# Patient Record
Sex: FEMALE | Race: ASIAN | Employment: UNEMPLOYED | URBAN - METROPOLITAN AREA
[De-identification: names, ages, dates, MRNs, and addresses within clinical notes are randomized per-mention and may not be internally consistent; named-entity substitution may affect disease eponyms.]

---

## 2017-01-25 ENCOUNTER — APPOINTMENT (OUTPATIENT)
Dept: GENERAL RADIOLOGY | Age: 37
End: 2017-01-25
Attending: EMERGENCY MEDICINE
Payer: SELF-PAY

## 2017-01-25 ENCOUNTER — HOSPITAL ENCOUNTER (EMERGENCY)
Age: 37
Discharge: HOME | End: 2017-01-25
Attending: EMERGENCY MEDICINE
Payer: SELF-PAY

## 2017-01-25 VITALS
DIASTOLIC BLOOD PRESSURE: 81 MMHG | HEIGHT: 65 IN | HEART RATE: 87 BPM | SYSTOLIC BLOOD PRESSURE: 152 MMHG | BODY MASS INDEX: 45.25 KG/M2 | WEIGHT: 271.61 LBS | OXYGEN SATURATION: 100 % | TEMPERATURE: 97.9 F | RESPIRATION RATE: 20 BRPM

## 2017-01-25 DIAGNOSIS — M54.5 ACUTE LOW BACK PAIN, UNSPECIFIED BACK PAIN LATERALITY, WITH SCIATICA PRESENCE UNSPECIFIED: Primary | ICD-10-CM

## 2017-01-25 DIAGNOSIS — N39.0 URINARY TRACT INFECTION, SITE UNSPECIFIED: ICD-10-CM

## 2017-01-25 LAB
APPEARANCE UR: ABNORMAL
BACTERIA URNS QL MICRO: ABNORMAL /HPF
BILIRUB UR QL: NEGATIVE
COLOR UR: ABNORMAL
EPITH CASTS URNS QL MICRO: ABNORMAL /LPF
GLUCOSE UR STRIP.AUTO-MCNC: NEGATIVE MG/DL
HCG UR QL: NEGATIVE
HGB UR QL STRIP: NEGATIVE
KETONES UR QL STRIP.AUTO: NEGATIVE MG/DL
LEUKOCYTE ESTERASE UR QL STRIP.AUTO: ABNORMAL
NITRITE UR QL STRIP.AUTO: POSITIVE
PH UR STRIP: 5.5 [PH] (ref 5–8)
PROT UR STRIP-MCNC: NEGATIVE MG/DL
RBC #/AREA URNS HPF: ABNORMAL /HPF (ref 0–5)
SP GR UR REFRACTOMETRY: 1.02 (ref 1–1.03)
UA: UC IF INDICATED,UAUC: ABNORMAL
UROBILINOGEN UR QL STRIP.AUTO: 0.2 EU/DL (ref 0.2–1)
WBC URNS QL MICRO: ABNORMAL /HPF (ref 0–4)

## 2017-01-25 PROCEDURE — 81001 URINALYSIS AUTO W/SCOPE: CPT | Performed by: EMERGENCY MEDICINE

## 2017-01-25 PROCEDURE — 99284 EMERGENCY DEPT VISIT MOD MDM: CPT

## 2017-01-25 PROCEDURE — 87086 URINE CULTURE/COLONY COUNT: CPT | Performed by: EMERGENCY MEDICINE

## 2017-01-25 PROCEDURE — 72100 X-RAY EXAM L-S SPINE 2/3 VWS: CPT

## 2017-01-25 PROCEDURE — 81025 URINE PREGNANCY TEST: CPT

## 2017-01-25 PROCEDURE — 74011250637 HC RX REV CODE- 250/637: Performed by: EMERGENCY MEDICINE

## 2017-01-25 PROCEDURE — 87186 SC STD MICRODIL/AGAR DIL: CPT | Performed by: EMERGENCY MEDICINE

## 2017-01-25 PROCEDURE — 87077 CULTURE AEROBIC IDENTIFY: CPT | Performed by: EMERGENCY MEDICINE

## 2017-01-25 RX ORDER — IBUPROFEN 800 MG/1
800 TABLET ORAL
Qty: 20 TAB | Refills: 0 | Status: SHIPPED | OUTPATIENT
Start: 2017-01-25 | End: 2017-02-01

## 2017-01-25 RX ORDER — CYCLOBENZAPRINE HCL 10 MG
10 TABLET ORAL
Status: COMPLETED | OUTPATIENT
Start: 2017-01-25 | End: 2017-01-25

## 2017-01-25 RX ORDER — CEFDINIR 300 MG/1
300 CAPSULE ORAL 2 TIMES DAILY
Qty: 20 CAP | Refills: 0 | Status: SHIPPED | OUTPATIENT
Start: 2017-01-25

## 2017-01-25 RX ORDER — CYCLOBENZAPRINE HCL 10 MG
10 TABLET ORAL
Qty: 15 TAB | Refills: 0 | Status: SHIPPED | OUTPATIENT
Start: 2017-01-25

## 2017-01-25 RX ORDER — CEFDINIR 300 MG/1
300 CAPSULE ORAL
Status: COMPLETED | OUTPATIENT
Start: 2017-01-25 | End: 2017-01-25

## 2017-01-25 RX ORDER — METHYLPREDNISOLONE 4 MG/1
TABLET ORAL
Qty: 1 DOSE PACK | Refills: 0 | Status: SHIPPED | OUTPATIENT
Start: 2017-01-25

## 2017-01-25 RX ADMIN — CEFDINIR 300 MG: 300 CAPSULE ORAL at 18:48

## 2017-01-25 RX ADMIN — CYCLOBENZAPRINE HYDROCHLORIDE 10 MG: 10 TABLET, FILM COATED ORAL at 18:38

## 2017-01-25 NOTE — ED NOTES
Pt given discharge instructions by provider. Opportunity given to ask questions. Pt ambulated to waiting area with family members.

## 2017-01-25 NOTE — ED PROVIDER NOTES
HPI Comments: 42 y.o. female with past medical history significant for bulging disc in back who presents from home with chief complaint of back pain. Pt complains of chronic lower back pain with radiation to the left thigh that was exacerbated after pt moved furniture in her house yesterday. Pt says pain caused her difficulty getting out of bed today, but pt is ambulatory. Pt last took 800 mg ibuprofen ~3hours PTA with some relief. No fever or incontinence. Pt denies PMHx. Pt denies allergies. There are no other acute medical concerns at this time. SHX:  No etoh. Nonsmoker. Note written by Tanvir. Radha Krause, as dictated by Krzysztof Farnsworth MD 5:37 PM      The history is provided by the patient and a relative. The history is limited by a language barrier. A  was used (relative). No past medical history on file. No past surgical history on file. No family history on file. Social History     Social History    Marital status: SINGLE     Spouse name: N/A    Number of children: N/A    Years of education: N/A     Occupational History    Not on file. Social History Main Topics    Smoking status: Not on file    Smokeless tobacco: Not on file    Alcohol use Not on file    Drug use: Not on file    Sexual activity: Not on file     Other Topics Concern    Not on file     Social History Narrative         ALLERGIES: Review of patient's allergies indicates no known allergies. Review of Systems   Musculoskeletal: Positive for back pain (w/ radiation to left thigh). All other systems reviewed and are negative. Vitals:    01/25/17 1707   BP: 152/81   Pulse: 87   Resp: 20   Temp: 97.9 °F (36.6 °C)   SpO2: 100%   Weight: 123.2 kg (271 lb 9.7 oz)   Height: 5' 5\" (1.651 m)            Physical Exam   Constitutional: She is oriented to person, place, and time. She appears well-developed and well-nourished. HENT:   Head: Normocephalic and atraumatic. Mouth/Throat: Oropharynx is clear and moist. No oropharyngeal exudate. Eyes: Conjunctivae are normal. Right eye exhibits no discharge. Left eye exhibits no discharge. No scleral icterus. Neck: Normal range of motion. Neck supple. Cardiovascular: Normal rate, regular rhythm and normal heart sounds. Exam reveals no gallop and no friction rub. No murmur heard. Pulmonary/Chest: Effort normal and breath sounds normal. No respiratory distress. She has no wheezes. She has no rales. Abdominal: Soft. Bowel sounds are normal. She exhibits no distension. There is no tenderness. There is no guarding. Musculoskeletal: Normal range of motion. She exhibits tenderness (mildly tender mid lumbar and left lumbar paraspinal muscles. ). She exhibits no edema. Lymphadenopathy:     She has no cervical adenopathy. Neurological: She is alert and oriented to person, place, and time. No cranial nerve deficit. She exhibits normal muscle tone. Coordination normal.   Ambulates with full weight bearing. Mildly antalgic on left. Intact light touch sensation of left leg   Skin: Skin is warm and dry. No rash noted. No pallor. Nursing note and vitals reviewed. MDM  ED Course   low back pain s/p lifting heavy objects with neuro intact. Check x ray and ua. Took motrin 800 mg. Will add flexeril for muscle spasm. Also will need medrol dose pack.       Procedures

## 2017-01-25 NOTE — ED TRIAGE NOTES
Pt reports hx bulging disc in back x 1.5 yrs ago. Yesterday she was moving furniture in the house and started with pain again. Pain to lower back with radiation to left upper leg.

## 2023-12-04 NOTE — DISCHARGE INSTRUCTIONS
Dolor de espalda: Instrucciones de cuidado - [ Back Pain: Care Instructions ]  Instrucciones de cuidado    El dolor de espalda tiene muchas causas posibles. Con frecuencia, está relacionado con problemas en los músculos y ligamentos de la espalda. También podría estar relacionado con problemas de los nervios, discos o huesos de la espalda. Moverse, levantar algo, ponerse de pie, sentarse o dormir de [employer] incómoda pueden forzar la espalda. Algunas veces no se da cuenta de que tiene tommie lesión [employer] tarde. La artritis es otra causa común del dolor de espalda. Aunque krystle vez duela mucho, el dolor de espalda por lo general mejora por sí mismo en varias semanas. [address] personas se recuperan en 12 semanas o menos. Hacer un buen tratamiento en el hogar y tener cuidado de no forzar la espalda puede ayudar a que se sienta mejor más pronto. La atención de seguimiento es tommie parte clave de morrison tratamiento y seguridad. Asegúrese de hacer y acudir a todas las citas, y llame a morrison médico si está teniendo problemas. También es tommie buena idea saber los resultados de los exámenes y mantener tommie lista de los medicamentos que [place]. ¿Cómo puede cuidarse en el hogar? · Siéntese o acuéstese en las posiciones más cómodas y que reduzcan el dolor. Trate tommie de estas posiciones al acostarse:  ¨ Acuéstese boca arriba con las rodillas dobladas y apoyadas sobre almohadones grandes. ¨ Acuéstese en el piso con las piernas sobre el asiento de un sofá o tommie silla. ¨ Acuéstese de lado con las rodillas y caderas dobladas y [place] entre las piernas. ¨ Acuéstese boca abajo siempre que esta posición no empeore el dolor. · No se siente en la cama y evite los sofás blandos y las posiciones torcidas. El reposo en cama puede aliviar el dolor al principio, carlyle retrasa la sanación. Evite reposar en la cama después del primer día de dolor de espalda. · Cambie de posición cada 30 minutos.  Si debe sentarse por [employer] INDICATION FOR FOLLOW UP:  CAD with prior PCI, HL     HPI:       58 year old female with a past medical history of CAD(prior RCA PCI 4/26/23), HL here for follow up visit.  Doing well overall, only mild dyspnea at times with activity, no chest pain.       PMH:  Patient Active Problem List   Diagnosis    CAD in native artery       Past Surgical History:   Procedure Laterality Date    Coronary angiogram/possible ptca - cv  04/26/2023    Embolization uterine fibroid  2007    Eye surgery  2017    right cataract surgery    Tonsillectomy       MEDICATIONS  Current Outpatient Medications   Medication Sig Dispense Refill    prasugrel (EFFIENT) 10 MG Tab Take 1 tablet by mouth daily. Do not start before April 28, 2023. 90 tablet 3    nitroGLYCERIN (NITRODUR) 0.3 MG/HR patch Place 1 patch onto the skin daily.  Remove patch 12 hours after applying 90 patch 3    atorvastatin (LIPITOR) 80 MG tablet Take 1 tablet by mouth daily. 90 tablet 3    aspirin (Aspirin Childrens) 81 MG chewable tablet Chew 1 tablet by mouth daily. 90 tablet 3    metoPROLOL succinate (Toprol XL) 25 MG 24 hr tablet Take 1 tablet by mouth daily. 90 tablet 3    levothyroxine 100 MCG tablet Take 100 mcg by mouth daily.      VITAMIN D PO Take 1 tablet by mouth daily.      Ascorbic Acid (VITAMIN C PO) Take 1 tablet by mouth daily.      CALCIUM PO Take 1 tablet by mouth daily.      Multiple Vitamin (MULTIVITAMIN ADULT PO) Take 1 tablet by mouth daily.       No current facility-administered medications for this visit.       SOCIAL: No tobacco, etoh, or drug use.   Social History     Socioeconomic History    Marital status: /Civil Union     Spouse name: Not on file    Number of children: Not on file    Years of education: Not on file    Highest education level: Not on file   Occupational History    Not on file   Tobacco Use    Smoking status: Never     Passive exposure: Past    Smokeless tobacco: Never   Vaping Use    Vaping Use: never used   Substance and  Sexual Activity    Alcohol use: Yes     Comment: rarely    Drug use: Never    Sexual activity: Not on file   Other Topics Concern    Not on file   Social History Narrative    Not on file     Social Determinants of Health     Financial Resource Strain: Low Risk  (4/26/2023)    Financial Resource Strain     Social Determinants: Financial Resource Strain: None   Food Insecurity: No Food Insecurity (4/26/2023)    Food Insecurity     Social Determinants: Food Insecurity: Never   Transportation Needs: No Transportation Needs (4/26/2023)    PRAPARE - Transportation     Lack of Transportation (Medical): No     Lack of Transportation (Non-Medical): No   Physical Activity: Not on file   Stress: Not on file   Social Connections: Socially Integrated (4/26/2023)    Social Connections     Social Determinants: Social Connections: 5 or more times a week   Intimate Partner Violence: Not At Risk (4/26/2023)    Intimate Partner Violence     Social Determinants: Intimate Partner Violence Past Fear: No     Social Determinants: Intimate Partner Violence Current Fear: No       FAMILY HISTORY:  +cad and chf in mother (age 60s),  Father with AFIB and pacemaker   Family History   Problem Relation Age of Onset    Kidney disease Mother     Heart disease Mother     Diabetes Mother     COPD Father     Other Father         pacemaker    Heart disease Maternal Grandmother        ALLERGY:    ALLERGIES:  No Known Allergies    PHYSICAL EXAM  Vitals:    12/05/23 1139   BP: 112/66   BP Location: RUE - Right upper extremity   Patient Position: Sitting   Cuff Size: Regular   Pulse: 90   SpO2: 96%   Weight: 72.6 kg (160 lb)   Height: 5' 4\" (1.626 m)       General: Appears stated age, no acute distress.  Eyes: Conjunctivae without exudates or hemorrhage. Pupils symmetric.  Neck: No JVD. No thyroid enlargement.  Cardiac: Normal S1 and S2, regular rate and rhythm. No murmurs, rubs or gallops. Pedal pulses 2/2 bilaterally. Lower extremities without evidence of  tiempo, párese y descanse de estar sentado. Levántese y camine, o acuéstese en tommie posición cómoda. · Pruebe usar tommie almohadilla térmica a temperatura baja o mediana lance 15 a 20 minutos cada 2 ó 3 horas. Pruebe tommie ducha tibia en vez de tommie sesión con la almohadilla térmica. · También puede probar tommie compresa de hielo lance 10 a 15 minutos cada 2 a 3 horas. Póngase un paño melendez entre la compresa de hielo y la piel. · [employer] analgésicos (medicamentos para el dolor) exactamente según las indicaciones. ¨ Si el médico le recetó un analgésico, tómelo según las indicaciones. ¨ Si no está tomando un analgésico recetado, pregúntele a morrison médico si puede kobe denny de [employer]. · Bari caminatas cortas varias veces al día. Usted puede comenzar con 5 a 10 minutos, 3 ó 4 veces al día, y aumentar progresivamente hasta lograr caminatas más largas. Camine en superficies fuentes y evite colinas y escaleras hasta que la espalda esté mejor. · Regrese al [place] y otras actividades lo más pronto posible. El descanso continuo sin actividad por lo general no es melo para la espalda. · Para prevenir el dolor de espalda en el futuro, bari ejercicios para estirar y fortalecer la espalda y el abdomen. Aprenda a [employer], técnicas seguras para levantar peso y la mecánica corporal apropiada. ¿Cuándo debe pedir ayuda? Llame a morrison médico ahora mismo o busque atención médica inmediata si:  · Tiene un nuevo entumecimiento en las piernas o éste empeora. · Tiene un nuevo debilitamiento en [place]. ([place]pert[place] puede hacer que sea difícil ponerse de pie). · Pierde el control de la vejiga o los intestinos. Preste especial atención a los cambios en morrison caio y asegúrese de comunicarse con morrison médico si:  · El dolor Usclair Schaumann. · No está mejorando después de 2 semanas. ¿Dónde puede encontrar más información en inglés? Aayush Vanessa a [email or web address]   Escriba T524 en la búsqueda edema. No bruits auscultated.  Pulmonary:  no wheeze, no rales   Skin: Warm without evidence of rashes  Psych: Normal mood and affect. Alert and oriented    REVIEW OF SYSTEMS:    Limited review of systems was performed. No recent fever, chills, nausea, vomiting, diarrhea. No recent change in appetite. No recent syncope. No chest pain, mild dyspnea with some activities    LAB DATA AND IMAGING:    Triglycerides (mg/dL)   Date Value   09/21/2023 79     LDL (mg/dL)   Date Value   09/21/2023 67       Creatinine (mg/dL)   Date Value   04/27/2023 0.52     GOT/AST (Units/L)   Date Value   04/25/2023 15     GPT/ALT (Units/L)   Date Value   04/25/2023 29     No results found for: \"GGT\"  Alkaline Phosphatase (Units/L)   Date Value   04/25/2023 104     Bilirubin, Total (mg/dL)   Date Value   04/25/2023 0.5         Ecg: sinus with non-specific ST change      Cath 4/26/23  Mid RCA lesion with 99% stenosis.  Ost Cx to Prox Cx lesion with 80% stenosis.  Ost LAD to Prox LAD lesion with 50% stenosis.  Critically severe 99% stenosis of mid RCA. Successful PCI with 2.75 by 48 Synergy MICK preceded by NC balloon angioplasty with a short 2.5 NC and followed by post stent angioplasty with a 3.0 by 25 NC balloon with proximal optimization.   Severe large OM stenosis. This is a large vessel, is a bifurcation lesion into two branches  Moderate proximal LAD stenosis. Appears to be about 50%  The diagnostic case with performed via ulnar artery as the radial was too small on US. Needed several NTG injections to resolve spasm in the ulnar artery. The therapeutic part was carried out by right femoral approach with a single front wall puncture, XBRCA, Whisper wire combination      Echo 4/27/23  Final Impressions  Normal transthoracic echocardiogram.  No prior study available for comparison.        ASSESSMENT AND PLAN:   58 year old female with a past medical history of CAD(prior RCA PCI 4/26/23), HL here for follow up visit.  Doing well overall,  para aprender más acerca de \"Dolor de espalda: Instrucciones de cuidado - [ Back Pain: Care Instructions ]. \"  Revisado: [address] [place], 2016  Versión del contenido: 11.1  © 8018-0182 Healthwise, Incorporated. Las instrucciones de cuidado fueron adaptadas bajo licencia por Good Help Connections (which disclaims liability or warranty for this information). Si usted tiene [place] afección médica o sobre estas instrucciones, siempre pregunte a morrison profesional de caio. Healthwise, Incorporated niega toda garantía o responsabilidad por morrison uso de esta información. Cómo volver a la normalidad después de un dolor en la parte baja de la espalda: Instrucciones de cuidado - [ Getting Back to Normal After Low Back Pain: Care Instructions ]  Instrucciones de cuidado  Krystal todo el rama tiene alguna vez dolor en la parte baja de la espalda. La buena noticia es que con algunos cuidados personales básicos, la mayoría de los brent en la parte baja de la espalda desaparecen en pocos días o semanas. Algunas personas temen que el dolor empeore por hacer [place]. Anteriormente, las personas solían permanecer en cama porque creían que esto [place] humberto el dolor de espalda. [employer], los médicos piensan que en la mayoría de [facility], retomar negrito actividades normales es melo para morrison espalda, siempre y cuando evite hacer cosas que empeoren el dolor. La atención de seguimiento es tommie parte clave de morrison tratamiento y seguridad. Asegúrese de hacer y acudir a todas las citas, y llame a morrison médico si está teniendo problemas. También es tommie buena idea saber los resultados de los exámenes y mantener tommie lista de los medicamentos que [place]. ¿Cómo puede cuidarse en el hogar? Regrese poco a poco a negrito actividades diarias  · [employer] o omaira día que tenga dolor, no se esfuerce. Felicia retome negrito actividades y vuelva a morrison miguel normal tan pronto isrrael pueda. · Yudi ejercicios suaves, isrrael caminar.  [place] mantiene la columna vertebral flexible y ayuda a que negrito músculos se mantengan jakob. · Si es atleta, vuelva a negrito actividades con cuidado. Escoja algo de bajo impacto hasta que morrison dolor esté bajo control. Evite o cambie las actividades que le causen dolor  · Trate de no doblarse, levantar cosas pesadas o estirarse demasiado. Estos movimientos añaden tensión a morrison espalda. · En la cama, pruebe a acostarse de lado con [employer]. También puede acostarse boca arriba sobre el piso con tommie almohada debajo de las rodillas. · Cuando se siente, coloque tommie almohada pequeña, tommie toalla enrollada o un apoyo lumbar cilíndrico en la curvatura de la espalda para obtener un mayor apoyo. · Pruebe a colocar denny de los pies sobre un taburete o cambiar de posición cada pocos minutos si tiene que estar de pie por [employer]. Preste atención a la postura y mecánica corporal  La mecánica corporal es la forma en la que usted [place] morrison cuerpo. La postura es la manera en la que se sienta o se pone de pie. · Tenga mucho cuidado cuando vaya a levantar objetos. Si tiene que levantar un objeto, doble las rodillas y [facility] espalda recta. Evite girar y [facility] carga cerca de morrison cuerpo. · Párese y siéntese derecho, con los hombros hacia atrás y el estómago sumido para darle apoyo a la espalda. Busque ayuda cuando la necesite  · [employer] saber a las personas cuando necesite ayuda. Pídales a negrito familiares o amigos que le ayuden con las tareas que no pueda hacer en edna momento. · Sea honesto con morrison médico sobre cómo lo afecta el dolor. · Si ha tenido que ausentarse del [place] , [place] con morrison médico y con morrison jefe sobre un plan gradual para volver a trabajar. [place] si hay otras maneras de hacer morrison trabajo para evitar volver a lesionarse la espalda. Reduzca el estrés  Preocuparse por el dolor puede hacer que los músculos de la parte baja de la espalda se tensen. Y esto, a morrison vez, causa más dolor.  Estas only mild dyspnea at times with activity, no chest pain.   1. Follow up; PMH  CAD with prior PCI RCA, HL  -stable overall, no chest pain, only mild dyspnea at times  -check stress testing, has residual OM lesion medically treated   -medical therapies continued:  Aspirin, effient, statin, beta blocker  -cardiac diet and exercise  --LDL much improved from 196 to 67    Follow up 12months, will call with stress testing    son algunas cosas que puede hacer para relajar la mente y los músculos:  · Tómese entre 10 y 13 minutos para sentarse tranquilo y respirar profundamente. Intente concentrarse solo en morrison respiración. Si no puede alejar negrito pensamientos, piense en las cosas que le gui sentirse lillie. · Dedíquese a morrison pasatiempo favorito o trate de hacer algo nuevo. · Hable con un amigo, phani un libro o escuche morrison música favorita. · Busque un consejero que le agrade y sea de morrison confianza. Hable de [place] y [place] acerca de negrito problemas. Esté dispuesto a hacer algunos cambios. ¿Cuándo debe pedir ayuda? Llame al 911 en cualquier momento que considere que necesita atención de [place]. Por ejemplo, llame si:  · Es completamente incapaz de  tommie pierna. Llame a morrison médico ahora mismo o busque atención médica inmediata si:  · Tiene síntomas nuevos o peores en las piernas, el abdomen o las nalgas (glúteos). Los síntomas pueden incluir:  ¨ Entumecimiento u hormigueo. ¨ Debilidad. ¨ Dolor. · Pierde el control de la vejiga o del intestino. Preste especial atención a los cambios en morrison caio y asegúrese de comunicarse con morrison médico si:  · No mejora isrrael se esperaba. ¿Dónde puede encontrar más información en inglés? Lucretia Aden a [email or web address]/. Damien Melara [id number] en la búsqueda para aprender más acerca de \"Cómo volver a la normalidad después de un dolor en la parte baja de la espalda: Instrucciones de cuidado - [ Getting Back to Normal After Low Back Pain: Care Instructions ]. \"  Revisado: 23 acuna, 2016  Versión del contenido: 11.1  © 7478-2090 Healthwise, Incorporated. Las instrucciones de cuidado fueron adaptadas bajo licencia por Good Help Connections (which disclaims liability or warranty for this information). Si usted tiene [place] afección médica o sobre estas instrucciones, siempre pregunte a morrison profesional de caio.  [place], [place] niega toda garantía o responsabilidad por morrison uso de [employer]. Infección urinaria en las mujeres: Instrucciones de cuidado - [ Urinary Tract Infection in Women: Care Instructions ]  Instrucciones de cuidado    Tommie infección urinaria (UTI, por negrito siglas en inglés) es un término general que hace referencia a tommie infección que se produce en cualquier parte entre los riñones y la uretra (conducto por el cual se expulsa la orina). La mayoría de las UTI son infecciones de la vejiga. Con frecuencia, causan dolor o ardor al [place]. Las UTI son causadas por bacterias y pueden curarse con antibióticos. Asegúrese de completar el tratamiento para que la infección desaparezca. La atención de seguimiento es tommie parte clave de morrison tratamiento y seguridad. Asegúrese de hacer y acudir a todas las citas, y llame a morrison médico si está teniendo problemas. También es tommie buena idea saber los resultados de los exámenes y mantener tommie lista de los medicamentos que [place]. ¿Cómo puede cuidarse en el hogar? · [address] No deje de tomarlos por el hecho de sentirse mejor. Debe kobe todos los antibióticos hasta terminarlos. · Gricelda los próximos denny o [address], susanna mayor cantidad de [place] y otros líquidos. [place] puede ayudar a eliminar las bacterias que provocan la infección. (Si tiene tommie enfermedad de los riñones, el corazón o el hígado y tiene que [employer] líquidos, hable con morrison médico antes de aumentar morrison consumo). · Evite las bebidas gaseosas o con cafeína. Pueden irritar la vejiga. · Orine con frecuencia. Trate de vaciar la vejiga cada vez que orine. · Para aliviar el dolor, tome un baño caliente o colóquese tommie almohadilla térmica a baja temperatura sobre la parte baja del abdomen o la helena genital. Nunca se duerma mientras [place] tommie almohadilla térmica. Para prevenir las infecciones urinarias  · Susanna abundante agua todos los días. [place] la ayuda a orinar con frecuencia, lo que elimina las bacterias de morrison sistema.  (Si tiene [facility] Financial, el corazón o el hígado y tiene que [employer] líquidos, hable con morrison médico antes de aumentar morrison consumo). · Considere añadir el jugo de arándanos (\"cranberry\") a morrison dieta. · Orine cuando necesite hacerlo. · Orine inmediatamente después de ariel tenido [employer]. · Cámbiese las toallas sanitarias con frecuencia. · Evite el uso de lavados vaginales, los janie de burbujas, los [address] de higiene femenina y otros productos para la higiene femenina que contengan desodorantes. · Después de ir al baño, límpiese de adelante hacia atrás. ¿Cuándo debe pedir ayuda? Llame a morrison médico ahora mismo o busque atención médica inmediata si:  · Aparecen síntomas isrrael fiebre, escalofríos, náuseas o vómito por Beal Hamman, o empeoran. · Tiene un nuevo dolor de espalda mirella debajo de las [place] se llama dolor en el flanco.  · Tiene agus o pus nuevos en la orina. · Tiene problemas con morrison antibiótico.  Preste especial atención a los cambios en morrison caio y asegúrese de comunicarse con morrison médico si:  · No mejora después de ariel tomado un antibiótico lance 2 días. · Yaima síntomas desaparecen y [employer]. ¿Dónde puede encontrar más información en inglés? Anastacia Noss a [email or web address]/. Eileen Severino [id number] en la búsqueda para aprender más acerca de \"Infección urinaria en las mujeres: Instrucciones de cuidado - [ Urinary Tract Infection in Women: Care Instructions ]. \"  Revisado: 12 agosto, 2016  Versión del contenido: 11.1  © 8139-5218 Healthwise, Incorporated. Las instrucciones de cuidado fueron adaptadas bajo licencia por Good Help Connections (which disclaims liability or warranty for this information). Si usted tiene [place] afección médica o sobre estas instrucciones, siempre pregunte a morrison profesional de caio. Healthwise, Incorporated niega toda garantía o responsabilidad por morrison uso de esta información.          We hope that we have addressed all of your medical concerns. The examination and treatment you received in the Emergency Department were for an emergent problem and were not intended as complete care. It is important that you follow up with your healthcare provider(s) for ongoing care. If your symptoms worsen or do not improve as expected, and you are unable to reach your usual health care provider(s), you should return to the Emergency Department. Today's healthcare is undergoing tremendous change, and patient satisfaction surveys are one of the many tools to assess the quality of medical care. You may receive a survey from the [employer] regarding your experience in the Emergency Department. I hope that your experience has been completely positive, particularly the medical care that I provided. As such, please participate in the survey; anything less than excellent does not meet my expectations or intentions. [address] [facility] and [address] [place] [address] participate in nationally recognized quality of care measures. If your blood pressure is greater than 120/80, as reported below, we urge that you seek medical care to address the potential of high blood pressure, commonly known as hypertension. Hypertension can be hereditary or can be caused by certain medical conditions, pain, stress, or \"white coat syndrome. \"       Please make an appointment with your health care provider(s) for follow up of your Emergency Department visit. VITALS:   Patient Vitals for the past 8 hrs:   Temp Pulse Resp BP SpO2   01/25/17 1707 97.9 °F (36.6 °C) 87 20 152/81 100 %          Thank you for allowing us to provide you with medical care today. We realize that you have many choices for your emergency care needs. Please choose us in the future for any continued health care needs. Will Akers, [address] [place] [address] [address]   Office: [phone]            Recent Results (from the past 24 hour(s))   URINALYSIS W/ REFLEX CULTURE    Collection Time: 01/25/17  6:11 PM   Result Value Ref Range    Color YELLOW/STRAW      Appearance CLOUDY (A) CLEAR      Specific gravity 1.018 1.003 - 1.030      pH (UA) 5.5 5.0 - 8.0      Protein NEGATIVE  NEG mg/dL    Glucose NEGATIVE  NEG mg/dL    Ketone NEGATIVE  NEG mg/dL    Bilirubin NEGATIVE  NEG      Blood NEGATIVE  NEG      Urobilinogen 0.2 0.2 - 1.0 EU/dL    Nitrites POSITIVE (A) NEG      Leukocyte Esterase MODERATE (A) NEG      WBC PENDING /hpf    RBC PENDING /hpf    Epithelial cells PENDING /lpf    Bacteria PENDING /hpf    UA:UC IF INDICATED PENDING    HCG URINE, QL. - POC    Collection Time: 01/25/17  6:13 PM   Result Value Ref Range    Pregnancy test,urine (POC) NEGATIVE  NEG         Xr Spine Lumb 2 Or 3 V    Result Date: 1/25/2017  EXAM:  XR SPINE LUMB 2 OR 3 V INDICATION: Low back pain. COMPARISON: None. FINDINGS: AP, lateral and spot lateral views of the lumbar spine demonstrate normal alignment. The vertebral body heights and disc spaces are well-preserved. There is no fracture, subluxation or other acute abnormality. Is mild facet arthrosis at L5-S1. IMPRESSION: No acute abnormality.